# Patient Record
Sex: MALE | Race: WHITE
[De-identification: names, ages, dates, MRNs, and addresses within clinical notes are randomized per-mention and may not be internally consistent; named-entity substitution may affect disease eponyms.]

---

## 2017-12-15 ENCOUNTER — HOSPITAL ENCOUNTER (EMERGENCY)
Dept: HOSPITAL 17 - PHEFT | Age: 32
Discharge: HOME | End: 2017-12-15
Payer: COMMERCIAL

## 2017-12-15 VITALS
SYSTOLIC BLOOD PRESSURE: 124 MMHG | HEART RATE: 80 BPM | DIASTOLIC BLOOD PRESSURE: 76 MMHG | TEMPERATURE: 98.8 F | OXYGEN SATURATION: 97 % | RESPIRATION RATE: 16 BRPM

## 2017-12-15 VITALS
OXYGEN SATURATION: 100 % | DIASTOLIC BLOOD PRESSURE: 66 MMHG | TEMPERATURE: 98.8 F | HEART RATE: 74 BPM | RESPIRATION RATE: 18 BRPM | SYSTOLIC BLOOD PRESSURE: 119 MMHG

## 2017-12-15 VITALS — HEIGHT: 66 IN | WEIGHT: 149.91 LBS | BODY MASS INDEX: 24.09 KG/M2

## 2017-12-15 DIAGNOSIS — H10.33: Primary | ICD-10-CM

## 2017-12-15 DIAGNOSIS — F17.210: ICD-10-CM

## 2017-12-15 DIAGNOSIS — R59.0: ICD-10-CM

## 2017-12-15 LAB
BASOPHILS # BLD AUTO: 0.1 TH/MM3 (ref 0–0.2)
BASOPHILS NFR BLD: 1.6 % (ref 0–2)
BUN SERPL-MCNC: 9 MG/DL (ref 7–18)
CALCIUM SERPL-MCNC: 8.3 MG/DL (ref 8.5–10.1)
CHLORIDE SERPL-SCNC: 101 MEQ/L (ref 98–107)
CREAT SERPL-MCNC: 0.84 MG/DL (ref 0.6–1.3)
EOSINOPHIL # BLD: 0.3 TH/MM3 (ref 0–0.4)
EOSINOPHIL NFR BLD: 4.8 % (ref 0–4)
ERYTHROCYTE [DISTWIDTH] IN BLOOD BY AUTOMATED COUNT: 13.3 % (ref 11.6–17.2)
GFR SERPLBLD BASED ON 1.73 SQ M-ARVRAT: 107 ML/MIN (ref 89–?)
GLUCOSE SERPL-MCNC: 92 MG/DL (ref 74–106)
HCO3 BLD-SCNC: 29.2 MEQ/L (ref 21–32)
HCT VFR BLD CALC: 37.6 % (ref 39–51)
HGB BLD-MCNC: 12.9 GM/DL (ref 13–17)
LYMPHOCYTES # BLD AUTO: 3.9 TH/MM3 (ref 1–4.8)
LYMPHOCYTES NFR BLD AUTO: 56 % (ref 9–44)
MCH RBC QN AUTO: 30.6 PG (ref 27–34)
MCHC RBC AUTO-ENTMCNC: 34.4 % (ref 32–36)
MCV RBC AUTO: 89 FL (ref 80–100)
MONOCYTE #: 0.5 TH/MM3 (ref 0–0.9)
MONOCYTES NFR BLD: 7.6 % (ref 0–8)
NEUTROPHILS # BLD AUTO: 2 TH/MM3 (ref 1.8–7.7)
NEUTROPHILS NFR BLD AUTO: 30 % (ref 16–70)
PLATELET # BLD: 127 TH/MM3 (ref 150–450)
PMV BLD AUTO: 6 FL (ref 7–11)
RBC # BLD AUTO: 4.23 MIL/MM3 (ref 4.5–5.9)
SODIUM SERPL-SCNC: 136 MEQ/L (ref 136–145)
WBC # BLD AUTO: 6.8 TH/MM3 (ref 4–11)

## 2017-12-15 PROCEDURE — 99285 EMERGENCY DEPT VISIT HI MDM: CPT

## 2017-12-15 PROCEDURE — 80048 BASIC METABOLIC PNL TOTAL CA: CPT

## 2017-12-15 PROCEDURE — 85025 COMPLETE CBC W/AUTO DIFF WBC: CPT

## 2017-12-15 PROCEDURE — 70486 CT MAXILLOFACIAL W/O DYE: CPT

## 2017-12-15 NOTE — PD
HPI


Chief Complaint:  Eye Problems/Injury


Time Seen by Provider:  12:11


Travel History


International Travel<30 days:  No


Contact w/Intl Traveler<30days:  No


Traveled to known affect area:  No





History of Present Illness


HPI


Patient comes in complaining of bilateral eye pain ongoing for a week.  Left 

greater than right.  Patient reports that he awoke with pain. Pain is a burning 

pulsating sensation is worse with certain movement of his eyes.  Patient been 

using over-the-counter eyedrops and eyewash with no improvement of symptoms.  

Patient reports blurring vision in his left eye with this.  Patient denies any 

known injury, foreign body sensation, or contact lens use.  Denies any crusting 

or discharge.  Denies any fevers or weight loss.  Patient feels the right is 

somewhat better, but left is not getting any better.





Formerly [facility]


Past Medical History


Medical History:  Denies Significant Hx


Diminished Hearing:  No


Tetanus Vaccination:  Unknown





Past Surgical History


Surgical History:  No Previous Surgery





Social History


Alcohol Use:  Yes (RARE)


Tobacco Use:  Yes (1/2 PPD)


Substance Use:  No





Allergies-Medications


(Allergen,Severity, Reaction):  


Coded Allergies:  


     No Known Allergies (Unverified , 12/15/17)


Reported Meds & Prescriptions





Reported Meds & Active Scripts


Active


Erythromycin Opth Oint 5 Mg/Gm Oint 1 Applic EACH EYE BID








Review of Systems


Except as stated in HPI:  all other systems reviewed are Neg





Physical Exam


Narrative


GENERAL: Well-developed, well nourished, in no acute distress, and non-ill 

appearing.


SKIN: Focused skin assessment warm and dry.


HEAD: Atraumatic. Normocephalic. 


EYES: Pupils equal and round. EOMI. No scleral icterus.  Mild injection 

bilaterally without drainage. 


ENT: No nasal bleeding or discharge.  Mucous membranes pink and moist.


NECK: Trachea midline. Supple.  No nuclear rigidity.


RESPIRATORY: No accessory muscle use.  No respiratory distress. 


MUSCULOSKELETAL: No obvious deformities. No clubbing.  No cyanosis.  No edema.  

Full range of motion.


NEUROLOGICAL: Awake and alert. No obvious cranial nerve deficits.  Motor 

grossly within normal limits. Normal speech.


PSYCHIATRIC: Appropriate mood and affect; insight and judgment normal.





Data


Data


Last Documented VS





Vital Signs








  Date Time  Temp Pulse Resp B/P (MAP) Pulse Ox O2 Delivery O2 Flow Rate FiO2


 


12/15/17 15:02        


 


12/15/17 14:01 98.8 74 18  100 Room Air  








Orders





 Orders


Basic Metabolic Panel (Bmp) (12/15/17 12:13)


Complete Blood Count With Diff (12/15/17 12:13)


Iv Access Insert/Monitor (12/15/17 12:13)


Sodium Chloride 0.9% Flush (Ns Flush) (12/15/17 12:15)


Ed Poc Ultrasound (12/15/17 )


Ct Facial Bones W/O Iv Cont (12/15/17 )


Proparacaine 0.5% Opth Soln (Alcaine 0.5 (12/15/17 14:30)


Ed Discharge Order (12/15/17 14:51)





Labs





Laboratory Tests








Test


  12/15/17


12:25


 


White Blood Count 6.8 TH/MM3 


 


Red Blood Count 4.23 MIL/MM3 


 


Hemoglobin 12.9 GM/DL 


 


Hematocrit 37.6 % 


 


Mean Corpuscular Volume 89.0 FL 


 


Mean Corpuscular Hemoglobin 30.6 PG 


 


Mean Corpuscular Hemoglobin


Concent 34.4 % 


 


 


Red Cell Distribution Width 13.3 % 


 


Platelet Count 127 TH/MM3 


 


Mean Platelet Volume 6.0 FL 


 


Neutrophils (%) (Auto) 30.0 % 


 


Lymphocytes (%) (Auto) 56.0 % 


 


Monocytes (%) (Auto) 7.6 % 


 


Eosinophils (%) (Auto) 4.8 % 


 


Basophils (%) (Auto) 1.6 % 


 


Neutrophils # (Auto) 2.0 TH/MM3 


 


Lymphocytes # (Auto) 3.9 TH/MM3 


 


Monocytes # (Auto) 0.5 TH/MM3 


 


Eosinophils # (Auto) 0.3 TH/MM3 


 


Basophils # (Auto) 0.1 TH/MM3 


 


CBC Comment DIFF FINAL 


 


Differential Comment  


 


Blood Urea Nitrogen 9 MG/DL 


 


Creatinine 0.84 MG/DL 


 


Random Glucose 92 MG/DL 


 


Calcium Level 8.3 MG/DL 


 


Sodium Level 136 MEQ/L 


 


Potassium Level 3.9 MEQ/L 


 


Chloride Level 101 MEQ/L 


 


Carbon Dioxide Level 29.2 MEQ/L 


 


Anion Gap 6 MEQ/L 


 


Estimat Glomerular Filtration


Rate 107 ML/MIN 


 











MDM


Medical Decision Making


Medical Screen Exam Complete:  Yes


Emergency Medical Condition:  Yes


Interpretation(s)





Last Impressions








Maxillofacial CT 12/15/17 0000 Signed





Impressions: 





 Service Date/Time:  Friday, December 15, 2017 13:30 - CONCLUSION:  1. No 





 significant abnormality involving the left orbit identified. 2. There are some 





 scattered, nonspecific node seen in the jugular brittney chains bilaterally. The 





 largest node on the right measures 1.5 cm in transverse dimension. The largest 





 node identified on the left measures 1.3 cm in transverse dimension.     

Kaden Churchill MD 








Differential Diagnosis


Over cellulitis, preseptal cellulitis, conjunctivitis, orbital abscess


Narrative Course


Patient with mild conjunctivitis. No evidence of foreign body by history or 

exam. No history to suspect corneal ulceration as well. There is no evidence of 

iritis, glaucoma, preseptal cellulitis, periorbital or orbital cellulitis. Will 

place patient on ophthalmologic antibiotics for nonspecific conjunctivitis. 

This was discussed with the patient. The patient was instructed to follow up 

with their physician or return here if worsened, increased pain, decreased 

vision, swelling around the eye or as needed. Ophthalmology referral was given 

if persists. The patient agreed with plan.





Patient in no obvious distress upon re-evaluation. All pertinent laboratory/

Radiology result(s) discussed with patient.  Discussed patient with Dr. Lorenz 

prior to discharge, who saw and evaluated the patient and is in agreement with 

plan of care and disposition.  Any questions/concerns in reference to patient 

diagnosis/condition discussed and clarified prior to patient's discharge. 

Reinforced sheer importance of close follow up with patient's primary physician 

or primary care clinic for reevaluation of incidental enlarged lymph nodes 

noted on CAT scan as well as ophthalmologist. Instructed patient to return to 

ED immediately, if symptoms return/worsen. Patient showed understanding of 

above instructions.  Further instructions and recommendations were detailed in 

discharge paperwork.  Patient ambulated without difficulty out of ED at 

discharge.





Procedures


**Procedure Narrative**


Verbal consent was obtained.  Affected eye was anesthetized using proparacaine.

  Fluorescein staining and Wood lamp exam performed  with no uptake seen.  

Negative Brittani sign. No hyphema, hyperemia, or rust ring. Eyelid was everted 

with no foreign body noted.  No tenderness bilateral temporal arteries to 

palpation.   Patient tolerated procedure well.





Diagnosis





 Primary Impression:  


 Bilateral conjunctivitis


 Qualified Codes:  H10.33 - Unspecified acute conjunctivitis, bilateral


 Additional Impression:  


 Enlarged lymph node in neck


Referrals:  


Sandra Monique MD





[facility]





Ear / Nose / Throat Specialist


Patient Instructions:  Conjunctivitis (ED), General Instructions, 

Lymphadenopathy (ED)





***Additional Instructions:  


Follow-up with your primary care physician, ophthalmologist, in ENT for 

reevaluation next week.  Take all medication as prescribed.  Return to the 

emergency department if symptoms get worse.


***Med/Other Pt SpecificInfo:  Prescription(s) given


Scripts


Erythromycin Opth Oint (Erythromycin Opth Oint) 5 Mg/Gm Oint


1 APPLIC EACH EYE BID for Infection, #1 TUBE 0 Refills


   Prov: Carol Lorenz          12/15/17


Disposition:  01 DISCHARGE HOME


Condition:  Stable











Ben Fried Dec 15, 2017 12:17

## 2017-12-15 NOTE — RADRPT
EXAM DATE/TIME:  12/15/2017 13:30 

 

[facility] COMPARISON:     

No previous studies available for comparison.

 

 

INDICATIONS :     

***Left eye pain, blurry vision and redness x 1 week, worse with eye movement.  Cephalgia.   Evaluate
 for orbital cellulitis. 

                      

 

RADIATION DOSE:     

29.85 CTDIvol (mGy) 

 

 

MEDICAL HISTORY :     

None  

 

SURGICAL HISTORY :      

None. 

 

ENCOUNTER:      

Initial

 

ACUITY:      

1 week

 

PAIN SCORE:      

7/10

 

LOCATION:       

Left  eye

 

TECHNIQUE:     

Volumetric scanning of the facial bones was performed.  Using automated exposure control and adjustme
nt of the mA and/or kV according to patient size, radiation dose was kept as low as reasonably achiev
able to obtain optimal diagnostic quality images.  DICOM format image data is available electronicall
y for review and comparison.  

 

FINDINGS:     

 

ORBITS:     

The orbital and infraorbital osseous structures are intact.  The retroconal structures have a normal 
configuration.  No radiopaque foreign bodies are seen.

 

NASAL BONE:     

The nasal bone and maxillary spine are intact

 

ZYGOMATIC ARCHES:     

Symmetric without evidence of fracture.

 

SINUSES:     

The maxillary, ethmoid and frontal sinuses are intact.  No air-fluid levels seen.

 

NASAL CAVITY:     

The nasal septum is intact and midline.  The lacrimal ducts are intact.

 

SOFT TISSUES:     

No radiopaque foreign bodies seen.  No soft-tissue swelling is seen. There are some      scattered, n
onspecific jugular nodes bilaterally.

 

INTRACRANIAL:     

No intracranial air seen.

 

CRIBIFORM PLATE:     

Grossly intact.

 

CONCLUSION:     

1. No significant abnormality involving the left orbit identified.

2. There are some scattered, nonspecific node seen in the jugular brittney chains bilaterally. The large
st node on the right measures 1.5 cm in transverse dimension. The largest node identified on the left
 measures 1.3 cm in transverse dimension.

 

 

 

 Kaden Churchill MD on December 15, 2017 at 13:46           

Board Certified Radiologist.

 This report was verified electronically.

## 2018-01-25 ENCOUNTER — HOSPITAL ENCOUNTER (EMERGENCY)
Dept: HOSPITAL 17 - NEPK | Age: 33
Discharge: LEFT BEFORE DISCHARGE | End: 2018-01-25
Payer: SELF-PAY

## 2018-01-25 VITALS — BODY MASS INDEX: 22.68 KG/M2 | WEIGHT: 141.1 LBS | HEIGHT: 66 IN

## 2018-01-25 VITALS
TEMPERATURE: 98.9 F | SYSTOLIC BLOOD PRESSURE: 147 MMHG | RESPIRATION RATE: 14 BRPM | OXYGEN SATURATION: 100 % | HEART RATE: 80 BPM | DIASTOLIC BLOOD PRESSURE: 90 MMHG

## 2018-01-25 DIAGNOSIS — M54.5: Primary | ICD-10-CM

## 2018-01-25 PROCEDURE — 99281 EMR DPT VST MAYX REQ PHY/QHP: CPT

## 2018-01-25 NOTE — PD
HPI


Chief Complaint:  Back/ Neck Pain or Injury


Time Seen by Provider:  11:07


Travel History


International Travel<30 days:  No


Contact w/Intl Traveler<30days:  No


Traveled to known affect area:  No





History of Present Illness


HPI


32-year-old  male presents the emergency Department with a five-year 

history of lumbar back pain which is been off and on and reportedly worsening 

over the past week.  Patient has no specific injury.  He does have a history of 

occasional IV drug use in the past.  He has no fever, chills, numbness, tingling

, or weakness in the lower extremities.  He states the pain is been 

progressively worsening over the past week and wakes him up occasionally at 

night.  Not taken any medications for the has not tried heat, ice, or 

stretching.  Pain is currently 5 out of 10.  He has no urinary symptoms.  He 

has no known drug allergies.





[facility]


Past Medical History


Diminished Hearing:  No





Social History


Alcohol Use:  Yes (RARE)


Tobacco Use:  Yes (1/2 PPD)


Substance Use:  No





Allergies-Medications


(Allergen,Severity, Reaction):  


Coded Allergies:  


     No Known Allergies (Unverified , 12/15/17)


Reported Meds & Prescriptions





Reported Meds & Active Scripts


Active


Erythromycin Opth Oint 5 Mg/Gm Oint 1 Applic EACH EYE BID








Review of Systems


Except as stated in HPI:  all other systems reviewed are Neg


General / Constitutional:  No: Fever


Eyes:  No: Visual changes


HENT:  No: Headaches


Cardiovascular:  No: Chest Pain or Discomfort


Respiratory:  No: Shortness of Breath


Gastrointestinal:  No: Abdominal Pain


Genitourinary:  No: Dysuria


Musculoskeletal:  Positive: Myalgias, Limited ROM, Pain


Skin:  No Rash


Neurologic:  No: Weakness


Psychiatric:  No: Depression


Endocrine:  No: Polydipsia


Hematologic/Lymphatic:  No: Easy Bruising





Physical Exam


Narrative


GENERAL: Patient is evaluated felt to be in no acute distress.


SKIN: Warm and dry.  Normal color.  Normal turgor.  No rash.


HEAD: Atraumatic. Normocephalic. 


EYES: Pupils equal and round. No scleral icterus. No injection or drainage. 


ENT: No nasal bleeding or discharge.  Mucous membranes pink and moist.


NECK: Trachea midline. No JVD. 


CARDIOVASCULAR: Regular rate and rhythm.  


RESPIRATORY: No accessory muscle use. Clear to auscultation. Breath sounds 

equal bilaterally. 


GASTROINTESTINAL: Abdomen soft, non-tender, nondistended. Hepatic and splenic 

margins not palpable. 


MUSCULOSKELETAL: Extremities without clubbing, cyanosis, or edema. No obvious 

deformities.  Patient has nonspecific tenderness with palpation along the upper 

lumbar lower thoracic area.  Range of motion is full.  Strength is normal.  

Lower extremities are normal.  Deep tendon reflexes are 2+ bilaterally  


NEUROLOGICAL: Awake and alert. No obvious cranial nerve deficits.  Motor 

grossly within normal limits. Five out of 5 muscle strength in the arms and 

legs.  Normal speech.


PSYCHIATRIC: Appropriate mood and affect; insight and judgment normal.





Data


Data


Last Documented VS





Vital Signs








  Date Time  Temp Pulse Resp B/P (MAP) Pulse Ox O2 Delivery O2 Flow Rate FiO2


 


1/25/18 10:35 98.9 80 14 147/90 (109) 100   











MDM


Medical Decision Making


Medical Screen Exam Complete:  Yes


Emergency Medical Condition:  No


Differential Diagnosis


Low back pain.  Muscle spasm.  Spinal stenosis.


Narrative Course


A medical screening exam was performed: 


At the time of evaluation the presenting medical condition was determined not 

to be of an emergent nature. 


The patient was given the option of receiving additional care, but declined. 


Patient was given options for additional community resources from which to 

obtain care.


The Patient Has Been advised to seek medical attention for their presenting 

complaint.


The patient has been advised to return to the ER at any time if an emergent 

condition develops.


Condition:  Stable











Ta Carter Jan 25, 2018 11:17